# Patient Record
Sex: FEMALE | NOT HISPANIC OR LATINO
[De-identification: names, ages, dates, MRNs, and addresses within clinical notes are randomized per-mention and may not be internally consistent; named-entity substitution may affect disease eponyms.]

---

## 2020-09-30 ENCOUNTER — RX ONLY (OUTPATIENT)
Age: 45
Setting detail: RX ONLY
End: 2020-09-30

## 2020-10-07 ENCOUNTER — APPOINTMENT (RX ONLY)
Dept: URBAN - METROPOLITAN AREA CLINIC 319 | Facility: CLINIC | Age: 45
Setting detail: DERMATOLOGY
End: 2020-10-07

## 2020-10-07 ENCOUNTER — APPOINTMENT (RX ONLY)
Dept: URBAN - METROPOLITAN AREA CLINIC 116 | Facility: CLINIC | Age: 45
Setting detail: DERMATOLOGY
End: 2020-10-07

## 2020-10-07 DIAGNOSIS — Z02.9 ENCOUNTER FOR ADMINISTRATIVE EXAMINATIONS, UNSPECIFIED: ICD-10-CM

## 2020-10-07 DIAGNOSIS — L259 CONTACT DERMATITIS AND OTHER ECZEMA, UNSPECIFIED CAUSE: ICD-10-CM

## 2020-10-07 PROBLEM — L30.9 DERMATITIS, UNSPECIFIED: Status: ACTIVE | Noted: 2020-10-07

## 2020-10-07 PROCEDURE — ? COUNSELING

## 2020-10-07 PROCEDURE — ? ADDITIONAL NOTES

## 2020-10-07 PROCEDURE — ? MEDICATION COUNSELING

## 2020-10-07 PROCEDURE — ? PRESCRIPTION

## 2020-10-07 PROCEDURE — 99213 OFFICE O/P EST LOW 20 MIN: CPT | Mod: 95

## 2020-10-07 RX ORDER — ACYCLOVIR 800 MG/1
TABLET ORAL
Qty: 50 | Refills: 0 | Status: ERX | COMMUNITY
Start: 2020-10-07

## 2020-10-07 RX ORDER — PREDNISONE 20 MG/1
TABLET ORAL
Qty: 20 | Refills: 0 | Status: ERX | COMMUNITY
Start: 2020-10-07

## 2020-10-07 RX ADMIN — PREDNISONE: 20 TABLET ORAL at 00:00

## 2020-10-07 RX ADMIN — ACYCLOVIR: 800 TABLET ORAL at 00:00

## 2020-10-07 ASSESSMENT — LOCATION DETAILED DESCRIPTION DERM
LOCATION DETAILED: RIGHT PROXIMAL DORSAL FOREARM
LOCATION DETAILED: LEFT VENTRAL PROXIMAL FOREARM
LOCATION DETAILED: RIGHT DISTAL POSTERIOR UPPER ARM

## 2020-10-07 ASSESSMENT — LOCATION SIMPLE DESCRIPTION DERM
LOCATION SIMPLE: LEFT FOREARM
LOCATION SIMPLE: RIGHT POSTERIOR UPPER ARM
LOCATION SIMPLE: RIGHT FOREARM

## 2020-10-07 ASSESSMENT — LOCATION ZONE DERM: LOCATION ZONE: ARM

## 2020-10-07 NOTE — PROCEDURE: ADDITIONAL NOTES
Additional Notes: Patient did not answer multiple attempts to contact regarding telehealth visit.
Detail Level: Simple

## 2020-10-07 NOTE — HPI: ITCHING
What Type Of Note Output Would You Prefer (Optional)?: Bullet Format
How Did Your Itching Occur?: sudden in onset (over a period of weeks to a few months)
How Severe Is Your Itching?: severe

## 2020-10-07 NOTE — PROCEDURE: ADDITIONAL NOTES
Detail Level: Simple
Additional Notes: Patient advised if not improving with meds to f/u in clinic for BX

## 2020-10-07 NOTE — PROCEDURE: MEDICATION COUNSELING
Xelafshinz Pregnancy And Lactation Text: This medication is Pregnancy Category D and is not considered safe during pregnancy.  The risk during breast feeding is also uncertain.